# Patient Record
Sex: FEMALE | Race: WHITE | HISPANIC OR LATINO | Employment: UNEMPLOYED | URBAN - METROPOLITAN AREA
[De-identification: names, ages, dates, MRNs, and addresses within clinical notes are randomized per-mention and may not be internally consistent; named-entity substitution may affect disease eponyms.]

---

## 2017-01-05 ENCOUNTER — HOSPITAL ENCOUNTER (OUTPATIENT)
Facility: HOSPITAL | Age: 37
Discharge: HOME | End: 2017-01-07
Attending: EMERGENCY MEDICINE | Admitting: HOSPITALIST
Payer: MEDICAID

## 2017-01-05 DIAGNOSIS — D64.9 NORMOCYTIC NORMOCHROMIC ANEMIA: ICD-10-CM

## 2017-01-05 DIAGNOSIS — K80.20 CALCULUS OF GALLBLADDER WITHOUT CHOLECYSTITIS WITHOUT OBSTRUCTION: ICD-10-CM

## 2017-01-05 DIAGNOSIS — E03.9 HYPOTHYROIDISM, UNSPECIFIED TYPE: ICD-10-CM

## 2017-01-05 DIAGNOSIS — K80.20 CHOLELITHIASIS: ICD-10-CM

## 2017-01-05 DIAGNOSIS — K80.00 ACUTE CHOLECYSTITIS DUE TO BILIARY CALCULUS: Primary | ICD-10-CM

## 2017-01-05 LAB
ALBUMIN SERPL BCP-MCNC: 3.9 G/DL
ALP SERPL-CCNC: 343 U/L
ALT SERPL W/O P-5'-P-CCNC: 606 U/L
ANION GAP SERPL CALC-SCNC: 4 MMOL/L
AST SERPL-CCNC: 464 U/L
B-HCG UR QL: NEGATIVE
BACTERIA #/AREA URNS HPF: ABNORMAL /HPF
BASOPHILS # BLD AUTO: 0.02 K/UL
BASOPHILS NFR BLD: 0.4 %
BILIRUB SERPL-MCNC: 0.9 MG/DL
BILIRUB UR QL STRIP: ABNORMAL
BUN SERPL-MCNC: 7 MG/DL
CALCIUM SERPL-MCNC: 9.9 MG/DL
CHLORIDE SERPL-SCNC: 103 MMOL/L
CLARITY UR: CLEAR
CO2 SERPL-SCNC: 32 MMOL/L
COLOR UR: ABNORMAL
CREAT SERPL-MCNC: 0.8 MG/DL
CTP QC/QA: YES
DIFFERENTIAL METHOD: ABNORMAL
EOSINOPHIL # BLD AUTO: 0.1 K/UL
EOSINOPHIL NFR BLD: 2.8 %
ERYTHROCYTE [DISTWIDTH] IN BLOOD BY AUTOMATED COUNT: 13.9 %
EST. GFR  (AFRICAN AMERICAN): >60 ML/MIN/1.73 M^2
EST. GFR  (NON AFRICAN AMERICAN): >60 ML/MIN/1.73 M^2
GLUCOSE SERPL-MCNC: 100 MG/DL
GLUCOSE UR QL STRIP: NEGATIVE
HCT VFR BLD AUTO: 36.4 %
HGB BLD-MCNC: 11.5 G/DL
HGB UR QL STRIP: NEGATIVE
HYALINE CASTS #/AREA URNS LPF: 1 /LPF
KETONES UR QL STRIP: ABNORMAL
LEUKOCYTE ESTERASE UR QL STRIP: NEGATIVE
LIPASE SERPL-CCNC: 15 U/L
LYMPHOCYTES # BLD AUTO: 2 K/UL
LYMPHOCYTES NFR BLD: 42.1 %
MCH RBC QN AUTO: 28.1 PG
MCHC RBC AUTO-ENTMCNC: 31.6 %
MCV RBC AUTO: 89 FL
MICROSCOPIC COMMENT: ABNORMAL
MONOCYTES # BLD AUTO: 0.4 K/UL
MONOCYTES NFR BLD: 7.5 %
NEUTROPHILS # BLD AUTO: 2.2 K/UL
NEUTROPHILS NFR BLD: 47 %
NITRITE UR QL STRIP: NEGATIVE
PH UR STRIP: 5 [PH] (ref 5–8)
PLATELET # BLD AUTO: 269 K/UL
PMV BLD AUTO: 9.2 FL
POTASSIUM SERPL-SCNC: 4 MMOL/L
PROT SERPL-MCNC: 8.2 G/DL
PROT UR QL STRIP: ABNORMAL
RBC # BLD AUTO: 4.09 M/UL
RBC #/AREA URNS HPF: 2 /HPF (ref 0–4)
SODIUM SERPL-SCNC: 139 MMOL/L
SP GR UR STRIP: 1.03 (ref 1–1.03)
URN SPEC COLLECT METH UR: ABNORMAL
UROBILINOGEN UR STRIP-ACNC: ABNORMAL EU/DL
WBC # BLD AUTO: 4.66 K/UL
WBC #/AREA URNS HPF: 3 /HPF (ref 0–5)

## 2017-01-05 PROCEDURE — 25000003 PHARM REV CODE 250: Performed by: PHYSICIAN ASSISTANT

## 2017-01-05 PROCEDURE — 83690 ASSAY OF LIPASE: CPT

## 2017-01-05 PROCEDURE — 99285 EMERGENCY DEPT VISIT HI MDM: CPT | Mod: 25

## 2017-01-05 PROCEDURE — 96376 TX/PRO/DX INJ SAME DRUG ADON: CPT

## 2017-01-05 PROCEDURE — 85025 COMPLETE CBC W/AUTO DIFF WBC: CPT

## 2017-01-05 PROCEDURE — 96374 THER/PROPH/DIAG INJ IV PUSH: CPT

## 2017-01-05 PROCEDURE — 81025 URINE PREGNANCY TEST: CPT | Performed by: EMERGENCY MEDICINE

## 2017-01-05 PROCEDURE — 80053 COMPREHEN METABOLIC PANEL: CPT

## 2017-01-05 PROCEDURE — 96361 HYDRATE IV INFUSION ADD-ON: CPT

## 2017-01-05 PROCEDURE — G0378 HOSPITAL OBSERVATION PER HR: HCPCS

## 2017-01-05 PROCEDURE — 81000 URINALYSIS NONAUTO W/SCOPE: CPT

## 2017-01-05 PROCEDURE — 63600175 PHARM REV CODE 636 W HCPCS: Performed by: PHYSICIAN ASSISTANT

## 2017-01-05 RX ORDER — KETOROLAC TROMETHAMINE 30 MG/ML
10 INJECTION, SOLUTION INTRAMUSCULAR; INTRAVENOUS EVERY 6 HOURS PRN
Status: DISCONTINUED | OUTPATIENT
Start: 2017-01-05 | End: 2017-01-06

## 2017-01-05 RX ORDER — DEXTROSE MONOHYDRATE AND SODIUM CHLORIDE 5; .9 G/100ML; G/100ML
INJECTION, SOLUTION INTRAVENOUS CONTINUOUS
Status: DISCONTINUED | OUTPATIENT
Start: 2017-01-05 | End: 2017-01-07 | Stop reason: HOSPADM

## 2017-01-05 RX ORDER — MORPHINE SULFATE 10 MG/ML
4 INJECTION INTRAMUSCULAR; INTRAVENOUS; SUBCUTANEOUS
Status: COMPLETED | OUTPATIENT
Start: 2017-01-05 | End: 2017-01-05

## 2017-01-05 RX ORDER — KETOROLAC TROMETHAMINE 30 MG/ML
15 INJECTION, SOLUTION INTRAMUSCULAR; INTRAVENOUS
Status: COMPLETED | OUTPATIENT
Start: 2017-01-05 | End: 2017-01-05

## 2017-01-05 RX ORDER — FAMOTIDINE 10 MG/ML
20 INJECTION INTRAVENOUS EVERY 12 HOURS
Status: DISCONTINUED | OUTPATIENT
Start: 2017-01-05 | End: 2017-01-07 | Stop reason: HOSPADM

## 2017-01-05 RX ORDER — ONDANSETRON 2 MG/ML
4 INJECTION INTRAMUSCULAR; INTRAVENOUS
Status: COMPLETED | OUTPATIENT
Start: 2017-01-05 | End: 2017-01-05

## 2017-01-05 RX ORDER — ONDANSETRON 2 MG/ML
4 INJECTION INTRAMUSCULAR; INTRAVENOUS EVERY 12 HOURS PRN
Status: DISCONTINUED | OUTPATIENT
Start: 2017-01-05 | End: 2017-01-07 | Stop reason: HOSPADM

## 2017-01-05 RX ADMIN — FAMOTIDINE 20 MG: 10 INJECTION, SOLUTION INTRAVENOUS at 11:01

## 2017-01-05 RX ADMIN — DEXTROSE MONOHYDRATE AND SODIUM CHLORIDE: 5; .9 INJECTION, SOLUTION INTRAVENOUS at 10:01

## 2017-01-05 RX ADMIN — ONDANSETRON 4 MG: 2 INJECTION INTRAMUSCULAR; INTRAVENOUS at 07:01

## 2017-01-05 RX ADMIN — KETOROLAC TROMETHAMINE 15 MG: 30 INJECTION, SOLUTION INTRAMUSCULAR at 07:01

## 2017-01-05 RX ADMIN — SODIUM CHLORIDE 1000 ML: 0.9 INJECTION, SOLUTION INTRAVENOUS at 07:01

## 2017-01-05 RX ADMIN — MORPHINE SULFATE 4 MG: 10 INJECTION INTRAVENOUS at 07:01

## 2017-01-05 NOTE — IP AVS SNAPSHOT
[place] [address] [place] [address]  Phone: [phone]           Patient Discharge Instructions     Our goal is to set you up for success. This packet includes information on your condition, medications, and your home care. It will help you to care for yourself so you don't get sicker and need to go back to the hospital.     Please ask your nurse if you have any questions.        There are many details to remember when preparing to leave the hospital. Here is what you will need to do:    1. Take your medicine. If you are prescribed medications, review your Medication List in the following pages. You may have new medications to  at the pharmacy and others that you'll need to stop taking. Review the instructions for how and when to take your medications. Talk with your doctor or nurses if you are unsure of what to do.     2. Go to your follow-up appointments. Specific follow-up information is listed in the following pages. Your may be contacted by a transition nurse or clinical provider about future appointments. Be sure we have all of the phone numbers to reach you, if needed. Please contact your provider's office if you are unable to make an appointment.     3. Watch for warning signs. Your doctor or nurse will give you detailed warning signs to watch for and when to call for assistance. These instructions may also include educational information about your condition. If you experience any of warning signs to your health, call your doctor.           ** Verificar la lista de medicamentos es correcta y está actualizada. Llevar esto con usted en tessie de emergencia. Si ronnie medicamentos joseph cambiado, por favor notifique a alberto proveedor de atención médica.             Medication List      START taking these medications        Additional Info                      hydrocodone-acetaminophen 5-325mg 5-325 mg per tablet   Commonly known as:  NORCO   Quantity:  30 tablet   Refills:   0   Dose:  1 tablet    Last time this was given:  1 tablet on 1/7/2017  2:57 PM   Instructions:  Take 1 tablet by mouth every 6 (six) hours as needed.     Begin Date    AM    Noon    PM    Bedtime         CONTINUE taking these medications        Additional Info                      levothyroxine 50 MCG tablet   Commonly known as:  SYNTHROID   Quantity:  30 tablet   Refills:  6   Dose:  50 mcg    Instructions:  Take 1 tablet (50 mcg total) by mouth once daily.     Begin Date    AM    Noon    PM    Bedtime         STOP taking these medications     omeprazole 40 MG capsule   Commonly known as:  PRILOSEC            Where to Get Your Medications      These medications were sent to [id number] - [place], [place] - [address] AT [facility] [address]  [address] [place] [address]    Hours:  24-hours Phone:  [phone]     hydrocodone-acetaminophen 5-325mg 5-325 mg per tablet                  Por favor traiga a todos las citas de seguimiento:    1. Jackelyn copia de las instrucciones de luis.  2. Todos los medicamentos que está tomando ene momento, en ronnie envases originales.  3. Identificación y tarjeta de seguro.    Por favor llegue 15 minutos antes de la hora de la suhas.    Por favor llame con 24 horas de antelación si tiene que cambiar alberto suhas y / o tiempo.        Your Scheduled Appointments     Jan 26, 2017  9:00 AM CST   Established Patient with MD Dr. Sharon Ojeda ([facility])    [address] [facility] [address] [phone]              Follow-up Information     Follow up with Sharon Saab MD. Schedule an appointment as soon as possible for a visit in 1 week.    Specialty:  Family Medicine    Contact information:    [address] [place] [address] [phone]          Follow up with Louie Pihlip MD On 1/19/2017.    Specialty:  General Surgery    Why:  1:10 p.m. on January 19, 2017 see Dr. Philip for your hospital followup visit.    Contact  "information:    [address] [place] [address]  [phone]            Primary Diagnosis     Your primary diagnosis was:  Acute Cholecystitis Due To Biliary Calculus      Admission Information     Date & Time Provider Department CSN    1/5/2017  6:07 PM Sharon Saab MD [facility] [id number]      Care Providers     Provider Role Specialty Primary office phone    Sharon Saab MD Attending Provider Family Medicine [phone]    [facility], [facility] Consulting Physician  -- Number not on file    Javi Avalos MD Consulting Physician  Gastroenterology [phone]    Louie Philip MD Consulting Physician  General Surgery  [phone]    Louie Philip MD Surgeon  General Surgery [phone]    Sam Ornelas MD Consulting Physician  Internal Medicine  [phone]      Your Vitals Were     BP Pulse Temp Resp Height Weight    118/63 (BP Location: Left arm, Patient Position: Lying, BP Method: Automatic) 96 98.6 °F (37 °C) (Oral) 18 5' 2" (1.575 m) 59 kg (130 lb)    Last Period SpO2 BMI          11/15/2016 99% 23.78 kg/m2        Recent Lab Values     No lab values to display.      Pending Labs     Order Current Status    Specimen to Pathology - Surgery Collected (01/06/17 1512)    Specimen to Pathology - Surgery Collected (01/06/17 1515)    Hepatitis panel, acute In process      Allergies as of 1/7/2017        Reactions    Epinephrine       [facility]     [facility] Nurse Care Line - 24/7 Assistance  Unless otherwise directed by your provider, please contact [facility], our nurse care line that is available for 24/7 assistance.     Registered nurses in the [facility] provide clinical advisement, health education, appointment booking, and other advisory services.  Call for this free service at [phone].        Advance Directives     An advance directive is a document which, in the event you are no longer able to make decisions for yourself, tells your " healthcare team what kind of treatment you do or do not want to receive, or who you would like to make those decisions for you.  If you do not currently have an advance directive, [facility] encourages you to create one.  For more information call:  [phone] ([phone]),  or log on to [email or web address].        Language Assistance Services     ATTENTION: Language assistance services are available, free of charge. Please call [phone].      ATENCIÓN: Si habla español, tiene a alberto disposición servicios gratuitos de asistencia lingüística. Llame al [phone].     CHÚ Ý: N?u b?n nói Ti?ng Vi?t, có các d?ch v? h? tr? ngôn ng? mi?n phí dành cho b?n. G?i s? [phone].        [facility] Sign-Up     Activating your [facility] account is as easy as 1-2-3!     1) Visit [email or web address], select Sign Up Now, enter this activation code and your date of birth, then select Next.  [email or web address]  Expires: 2/19/2017  9:58 PM      2) Create a username and password to use when you visit [facility] in the future and select a security question in case you lose your password and select Next.    3) Enter your e-mail address and click Sign Up!    Additional Information  If you have questions, please e-mail [email or web address] or call [phone] to talk to our [facility] staff. Remember, [facility] is NOT to be used for urgent needs. For medical emergencies, dial 911.          [facility] complies with applicable Federal civil rights laws and does not discriminate on the basis of race, color, national origin, age, disability, or sex.

## 2017-01-05 NOTE — IP AVS SNAPSHOT
[address] [place] [address]  Phone: [phone]           I have received a copy of my After Visit Summary and discharge instructions from [facility].    INSTRUCTIONS RECEIVED AND UNDERSTOOD BY:                     Patient/Patient Representative: ________________________________________________________________     Date/Time: ________________________________________________________________                     Instructions Given By: ________________________________________________________________     Date/Time: ________________________________________________________________

## 2017-01-06 ENCOUNTER — ANESTHESIA EVENT (OUTPATIENT)
Dept: SURGERY | Facility: HOSPITAL | Age: 37
End: 2017-01-06
Payer: MEDICAID

## 2017-01-06 ENCOUNTER — ANESTHESIA (OUTPATIENT)
Dept: SURGERY | Facility: HOSPITAL | Age: 37
End: 2017-01-06
Payer: MEDICAID

## 2017-01-06 PROBLEM — K80.50 BILIARY COLIC: Status: ACTIVE | Noted: 2017-01-06

## 2017-01-06 LAB
ALBUMIN SERPL BCP-MCNC: 3.1 G/DL
ALP SERPL-CCNC: 293 U/L
ALT SERPL W/O P-5'-P-CCNC: 638 U/L
ANION GAP SERPL CALC-SCNC: 4 MMOL/L
AST SERPL-CCNC: 553 U/L
BASOPHILS # BLD AUTO: 0.01 K/UL
BASOPHILS NFR BLD: 0.3 %
BILIRUB SERPL-MCNC: 0.9 MG/DL
BUN SERPL-MCNC: 6 MG/DL
CALCIUM SERPL-MCNC: 8.6 MG/DL
CHLORIDE SERPL-SCNC: 110 MMOL/L
CO2 SERPL-SCNC: 27 MMOL/L
CREAT SERPL-MCNC: 0.7 MG/DL
DIFFERENTIAL METHOD: ABNORMAL
EOSINOPHIL # BLD AUTO: 0.2 K/UL
EOSINOPHIL NFR BLD: 4.4 %
ERYTHROCYTE [DISTWIDTH] IN BLOOD BY AUTOMATED COUNT: 14.4 %
EST. GFR  (AFRICAN AMERICAN): >60 ML/MIN/1.73 M^2
EST. GFR  (NON AFRICAN AMERICAN): >60 ML/MIN/1.73 M^2
FERRITIN SERPL-MCNC: 158 NG/ML
FOLATE SERPL-MCNC: 12.2 NG/ML
GLUCOSE SERPL-MCNC: 107 MG/DL
HCT VFR BLD AUTO: 31.6 %
HGB BLD-MCNC: 10.1 G/DL
IRON SERPL-MCNC: 163 UG/DL
LYMPHOCYTES # BLD AUTO: 2.2 K/UL
LYMPHOCYTES NFR BLD: 65 %
MCH RBC QN AUTO: 29 PG
MCHC RBC AUTO-ENTMCNC: 32 %
MCV RBC AUTO: 91 FL
MONOCYTES # BLD AUTO: 0.3 K/UL
MONOCYTES NFR BLD: 7.6 %
NEUTROPHILS # BLD AUTO: 0.8 K/UL
NEUTROPHILS NFR BLD: 22.7 %
PLATELET # BLD AUTO: 210 K/UL
PMV BLD AUTO: 9.2 FL
POTASSIUM SERPL-SCNC: 4.2 MMOL/L
PROT SERPL-MCNC: 6.6 G/DL
RBC # BLD AUTO: 3.48 M/UL
SATURATED IRON: 55 %
SODIUM SERPL-SCNC: 141 MMOL/L
T3 SERPL-MCNC: 89 NG/DL
T4 FREE SERPL-MCNC: 0.9 NG/DL
T4 SERPL-MCNC: 5.7 UG/DL
TOTAL IRON BINDING CAPACITY: 295 UG/DL
TRANSFERRIN SERPL-MCNC: 199 MG/DL
TSH SERPL DL<=0.005 MIU/L-ACNC: 10.6 UIU/ML
VIT B12 SERPL-MCNC: 974 PG/ML
WBC # BLD AUTO: 3.43 K/UL

## 2017-01-06 PROCEDURE — 25000003 PHARM REV CODE 250: Performed by: SURGERY

## 2017-01-06 PROCEDURE — 88304 TISSUE EXAM BY PATHOLOGIST: CPT | Mod: 26,,,

## 2017-01-06 PROCEDURE — G0378 HOSPITAL OBSERVATION PER HR: HCPCS

## 2017-01-06 PROCEDURE — 82728 ASSAY OF FERRITIN: CPT

## 2017-01-06 PROCEDURE — 85025 COMPLETE CBC W/AUTO DIFF WBC: CPT

## 2017-01-06 PROCEDURE — 82746 ASSAY OF FOLIC ACID SERUM: CPT

## 2017-01-06 PROCEDURE — 82607 VITAMIN B-12: CPT

## 2017-01-06 PROCEDURE — 88304 TISSUE EXAM BY PATHOLOGIST: CPT

## 2017-01-06 PROCEDURE — 36000708 HC OR TIME LEV III 1ST 15 MIN: Performed by: SURGERY

## 2017-01-06 PROCEDURE — 25000003 PHARM REV CODE 250: Performed by: PHYSICIAN ASSISTANT

## 2017-01-06 PROCEDURE — 71000039 HC RECOVERY, EACH ADD'L HOUR: Performed by: SURGERY

## 2017-01-06 PROCEDURE — 63600175 PHARM REV CODE 636 W HCPCS: Performed by: NURSE ANESTHETIST, CERTIFIED REGISTERED

## 2017-01-06 PROCEDURE — 63600175 PHARM REV CODE 636 W HCPCS: Performed by: SURGERY

## 2017-01-06 PROCEDURE — D9220A PRA ANESTHESIA: Mod: CRNA,,, | Performed by: NURSE ANESTHETIST, CERTIFIED REGISTERED

## 2017-01-06 PROCEDURE — 84480 ASSAY TRIIODOTHYRONINE (T3): CPT

## 2017-01-06 PROCEDURE — 71000033 HC RECOVERY, INTIAL HOUR: Performed by: SURGERY

## 2017-01-06 PROCEDURE — 36000709 HC OR TIME LEV III EA ADD 15 MIN: Performed by: SURGERY

## 2017-01-06 PROCEDURE — 80074 ACUTE HEPATITIS PANEL: CPT

## 2017-01-06 PROCEDURE — 37000008 HC ANESTHESIA 1ST 15 MINUTES: Performed by: SURGERY

## 2017-01-06 PROCEDURE — 25000003 PHARM REV CODE 250: Performed by: NURSE ANESTHETIST, CERTIFIED REGISTERED

## 2017-01-06 PROCEDURE — 80053 COMPREHEN METABOLIC PANEL: CPT

## 2017-01-06 PROCEDURE — 84436 ASSAY OF TOTAL THYROXINE: CPT

## 2017-01-06 PROCEDURE — 84443 ASSAY THYROID STIM HORMONE: CPT

## 2017-01-06 PROCEDURE — 37000009 HC ANESTHESIA EA ADD 15 MINS: Performed by: SURGERY

## 2017-01-06 PROCEDURE — 83540 ASSAY OF IRON: CPT

## 2017-01-06 PROCEDURE — 27201423 OPTIME MED/SURG SUP & DEVICES STERILE SUPPLY: Performed by: SURGERY

## 2017-01-06 PROCEDURE — 84439 ASSAY OF FREE THYROXINE: CPT

## 2017-01-06 PROCEDURE — D9220A PRA ANESTHESIA: Mod: ANES,,, | Performed by: ANESTHESIOLOGY

## 2017-01-06 PROCEDURE — 36415 COLL VENOUS BLD VENIPUNCTURE: CPT

## 2017-01-06 RX ORDER — CEFAZOLIN SODIUM 1 G/50ML
1 SOLUTION INTRAVENOUS
Status: DISCONTINUED | OUTPATIENT
Start: 2017-01-06 | End: 2017-01-06

## 2017-01-06 RX ORDER — PHENYLEPHRINE HYDROCHLORIDE 10 MG/ML
INJECTION INTRAVENOUS
Status: DISCONTINUED | OUTPATIENT
Start: 2017-01-06 | End: 2017-01-06

## 2017-01-06 RX ORDER — PROPOFOL 10 MG/ML
VIAL (ML) INTRAVENOUS
Status: DISCONTINUED | OUTPATIENT
Start: 2017-01-06 | End: 2017-01-06

## 2017-01-06 RX ORDER — ONDANSETRON 2 MG/ML
4 INJECTION INTRAMUSCULAR; INTRAVENOUS EVERY 8 HOURS PRN
Status: DISCONTINUED | OUTPATIENT
Start: 2017-01-06 | End: 2017-01-06 | Stop reason: HOSPADM

## 2017-01-06 RX ORDER — SODIUM CHLORIDE, SODIUM LACTATE, POTASSIUM CHLORIDE, CALCIUM CHLORIDE 600; 310; 30; 20 MG/100ML; MG/100ML; MG/100ML; MG/100ML
INJECTION, SOLUTION INTRAVENOUS CONTINUOUS PRN
Status: DISCONTINUED | OUTPATIENT
Start: 2017-01-06 | End: 2017-01-06

## 2017-01-06 RX ORDER — MORPHINE SULFATE 10 MG/ML
2 INJECTION INTRAMUSCULAR; INTRAVENOUS; SUBCUTANEOUS EVERY 6 HOURS PRN
Status: DISCONTINUED | OUTPATIENT
Start: 2017-01-06 | End: 2017-01-06

## 2017-01-06 RX ORDER — GLYCOPYRROLATE 0.2 MG/ML
INJECTION INTRAMUSCULAR; INTRAVENOUS
Status: DISCONTINUED | OUTPATIENT
Start: 2017-01-06 | End: 2017-01-06

## 2017-01-06 RX ORDER — SUCCINYLCHOLINE CHLORIDE 20 MG/ML
INJECTION INTRAMUSCULAR; INTRAVENOUS
Status: DISCONTINUED | OUTPATIENT
Start: 2017-01-06 | End: 2017-01-06

## 2017-01-06 RX ORDER — MORPHINE SULFATE 10 MG/ML
4 INJECTION INTRAMUSCULAR; INTRAVENOUS; SUBCUTANEOUS EVERY 4 HOURS PRN
Status: DISCONTINUED | OUTPATIENT
Start: 2017-01-06 | End: 2017-01-07 | Stop reason: HOSPADM

## 2017-01-06 RX ORDER — CEFAZOLIN SODIUM 1 G/50ML
SOLUTION INTRAVENOUS
Status: DISPENSED
Start: 2017-01-06 | End: 2017-01-07

## 2017-01-06 RX ORDER — METOCLOPRAMIDE HYDROCHLORIDE 5 MG/ML
INJECTION INTRAMUSCULAR; INTRAVENOUS
Status: DISCONTINUED | OUTPATIENT
Start: 2017-01-06 | End: 2017-01-06

## 2017-01-06 RX ORDER — FENTANYL CITRATE 50 UG/ML
INJECTION, SOLUTION INTRAMUSCULAR; INTRAVENOUS
Status: DISCONTINUED | OUTPATIENT
Start: 2017-01-06 | End: 2017-01-06

## 2017-01-06 RX ORDER — BUPIVACAINE HYDROCHLORIDE AND EPINEPHRINE 2.5; 5 MG/ML; UG/ML
INJECTION, SOLUTION EPIDURAL; INFILTRATION; INTRACAUDAL; PERINEURAL
Status: DISCONTINUED | OUTPATIENT
Start: 2017-01-06 | End: 2017-01-06 | Stop reason: HOSPADM

## 2017-01-06 RX ORDER — HYDROCODONE BITARTRATE AND ACETAMINOPHEN 5; 325 MG/1; MG/1
1 TABLET ORAL EVERY 6 HOURS PRN
Status: DISCONTINUED | OUTPATIENT
Start: 2017-01-06 | End: 2017-01-07 | Stop reason: HOSPADM

## 2017-01-06 RX ORDER — METOPROLOL TARTRATE 1 MG/ML
INJECTION, SOLUTION INTRAVENOUS
Status: DISCONTINUED | OUTPATIENT
Start: 2017-01-06 | End: 2017-01-06

## 2017-01-06 RX ORDER — DIPHENHYDRAMINE HYDROCHLORIDE 50 MG/ML
25 INJECTION INTRAMUSCULAR; INTRAVENOUS EVERY 6 HOURS PRN
Status: DISCONTINUED | OUTPATIENT
Start: 2017-01-06 | End: 2017-01-06 | Stop reason: HOSPADM

## 2017-01-06 RX ORDER — ROCURONIUM BROMIDE 10 MG/ML
INJECTION, SOLUTION INTRAVENOUS
Status: DISCONTINUED | OUTPATIENT
Start: 2017-01-06 | End: 2017-01-06

## 2017-01-06 RX ORDER — HYDROMORPHONE HYDROCHLORIDE 2 MG/ML
0.2 INJECTION, SOLUTION INTRAMUSCULAR; INTRAVENOUS; SUBCUTANEOUS EVERY 5 MIN PRN
Status: DISCONTINUED | OUTPATIENT
Start: 2017-01-06 | End: 2017-01-06 | Stop reason: HOSPADM

## 2017-01-06 RX ORDER — MIDAZOLAM HYDROCHLORIDE 1 MG/ML
INJECTION, SOLUTION INTRAMUSCULAR; INTRAVENOUS
Status: DISCONTINUED | OUTPATIENT
Start: 2017-01-06 | End: 2017-01-06

## 2017-01-06 RX ORDER — ONDANSETRON 2 MG/ML
INJECTION INTRAMUSCULAR; INTRAVENOUS
Status: DISCONTINUED | OUTPATIENT
Start: 2017-01-06 | End: 2017-01-06

## 2017-01-06 RX ORDER — NEOSTIGMINE METHYLSULFATE 1 MG/ML
INJECTION, SOLUTION INTRAVENOUS
Status: DISCONTINUED | OUTPATIENT
Start: 2017-01-06 | End: 2017-01-06

## 2017-01-06 RX ORDER — SODIUM CHLORIDE 0.9 % (FLUSH) 0.9 %
3 SYRINGE (ML) INJECTION
Status: DISCONTINUED | OUTPATIENT
Start: 2017-01-06 | End: 2017-01-06 | Stop reason: HOSPADM

## 2017-01-06 RX ADMIN — ROCURONIUM BROMIDE 5 MG: 10 INJECTION, SOLUTION INTRAVENOUS at 02:01

## 2017-01-06 RX ADMIN — CEFAZOLIN SODIUM 1 G: 1 SOLUTION INTRAVENOUS at 01:01

## 2017-01-06 RX ADMIN — FENTANYL CITRATE 100 MCG: 50 INJECTION INTRAMUSCULAR; INTRAVENOUS at 02:01

## 2017-01-06 RX ADMIN — SUCCINYLCHOLINE CHLORIDE 100 MG: 20 INJECTION, SOLUTION INTRAMUSCULAR; INTRAVENOUS at 02:01

## 2017-01-06 RX ADMIN — SODIUM CHLORIDE, SODIUM LACTATE, POTASSIUM CHLORIDE, AND CALCIUM CHLORIDE: .6; .31; .03; .02 INJECTION, SOLUTION INTRAVENOUS at 03:01

## 2017-01-06 RX ADMIN — PHENYLEPHRINE HYDROCHLORIDE 100 MCG: 10 INJECTION INTRAVENOUS at 02:01

## 2017-01-06 RX ADMIN — ROCURONIUM BROMIDE 10 MG: 10 INJECTION, SOLUTION INTRAVENOUS at 02:01

## 2017-01-06 RX ADMIN — GLYCOPYRROLATE 0.6 MG: 0.2 INJECTION, SOLUTION INTRAMUSCULAR; INTRAVENOUS at 02:01

## 2017-01-06 RX ADMIN — SODIUM CHLORIDE, SODIUM LACTATE, POTASSIUM CHLORIDE, AND CALCIUM CHLORIDE: .6; .31; .03; .02 INJECTION, SOLUTION INTRAVENOUS at 01:01

## 2017-01-06 RX ADMIN — NEOSTIGMINE METHYLSULFATE 4 MG: 1 INJECTION INTRAVENOUS at 02:01

## 2017-01-06 RX ADMIN — PROPOFOL 150 MG: 10 INJECTION, EMULSION INTRAVENOUS at 02:01

## 2017-01-06 RX ADMIN — PROPOFOL 100 MG: 10 INJECTION, EMULSION INTRAVENOUS at 02:01

## 2017-01-06 RX ADMIN — FAMOTIDINE 20 MG: 10 INJECTION, SOLUTION INTRAVENOUS at 08:01

## 2017-01-06 RX ADMIN — METOCLOPRAMIDE 10 MG: 5 INJECTION, SOLUTION INTRAMUSCULAR; INTRAVENOUS at 01:01

## 2017-01-06 RX ADMIN — PROPOFOL 50 MG: 10 INJECTION, EMULSION INTRAVENOUS at 02:01

## 2017-01-06 RX ADMIN — MORPHINE SULFATE 4 MG: 10 INJECTION INTRAVENOUS at 06:01

## 2017-01-06 RX ADMIN — ONDANSETRON 4 MG: 2 INJECTION, SOLUTION INTRAMUSCULAR; INTRAVENOUS at 01:01

## 2017-01-06 RX ADMIN — MIDAZOLAM HYDROCHLORIDE 2 MG: 1 INJECTION, SOLUTION INTRAMUSCULAR; INTRAVENOUS at 01:01

## 2017-01-06 RX ADMIN — GLYCOPYRROLATE 0.2 MG: 0.2 INJECTION, SOLUTION INTRAMUSCULAR; INTRAVENOUS at 01:01

## 2017-01-06 RX ADMIN — METOPROLOL TARTRATE 2 MG: 1 INJECTION, SOLUTION INTRAVENOUS at 02:01

## 2017-01-06 NOTE — CONSULTS
[facility]  Surgery H&P    Subjective:     Chief Complaint/Reason for Admission: abdominal pain    History of Present Illness:   Patient is a 36 y.o. female presents with abdominal pain. Onset of symptoms was gradual starting 7 days ago with gradually worsening course since that time. The pain is located in the RUQ with radiation to back. Patient describes the pain as aching and sharp, continuous and rated as severe. Pain has been associated with nausea. Patient denies change in bowel habits and vomiting. Symptoms are aggravated by eating. Symptoms improve with pain meds.     Patient Active Problem List   Diagnosis    GERD (gastroesophageal reflux disease)    Asthma    Healthcare maintenance    History of hepatitis A    BMI 25.0-25.9,adult    Calculus of gallbladder without cholecystitis    Hypothyroidism    Cholelithiasis          No current facility-administered medications on file prior to encounter.      Current Outpatient Prescriptions on File Prior to Encounter   Medication Sig Dispense Refill    levothyroxine (SYNTHROID) 50 MCG tablet Take 1 tablet (50 mcg total) by mouth once daily. 30 tablet 6          Review of patient's allergies indicates:   Allergen Reactions    Epinephrine           Past Medical History   Diagnosis Date    Asthma     Hepatitis           Past Surgical History   Procedure Laterality Date    Tonsillectomy      Tubal ligation  2014     section       x 3          Family History   Problem Relation Age of Onset    Hypertension Mother     Asthma Father     Hypertension Maternal Grandmother           Social History     Social History    Marital status: Single     Spouse name: N/A    Number of children: 3    Years of education: N/A     Occupational History    Unemployed      Social History Main Topics    Smoking status: Never Smoker    Smokeless tobacco: Never Used    Alcohol use No    Drug use: No    Sexual activity: Yes     Partners: Male     Other  Topics Concern    Not on file     Social History Narrative       Review of Systems:  Constitutional: positive for anorexia, negative for chills and fevers  Respiratory: negative for cough and dyspnea on exertion  Cardiovascular: negative for chest pain and palpitations  Hematologic/lymphatic: negative for bleeding and easy bruising     Physical Exam:    Vitals:    01/06/17 0730   BP: 133/67   Pulse: (!) 50   Resp: 18   Temp: 97.5 °F (36.4 °C)       General: Alert and oriented, No acute distress.   Eye: Extraocular movements are intact, Normal conjunctiva.  No scleral icterus  HENT: Normocephalic, Normal hearing, Oral mucosa is moist, No pharyngeal erythema.   Neck: Supple, Non-tender, No jugular venous distention.  No mass or LAD  Respiratory: Respirations are non-labored, Symmetrical chest wall expansion, No chest wall tenderness.   Cardiovascular: Normal rate, Regular rhythm, Extremities warm, No edema.   Gastrointestinal: Soft, non-distended, mild TTP RUQ, negative Vidal's sign   Genitourinary: No costovertebral angle tenderness, No inguinal tenderness or mass.   Lymphatics: No lymphadenopathy neck, axilla, groin.   Musculoskeletal: Normal range of motion, Normal strength, No tenderness.   Integumentary: Warm, Dry.   Neurologic: Alert, Oriented, Normal motor function, No focal defects.   Psychiatric: Cooperative, Appropriate mood & affect, Normal judgment.    Data Review:  CBC:   Recent Labs  Lab 01/06/17  0408   WBC 3.43*   RBC 3.48*   HGB 10.1*   HCT 31.6*      MCV 91   MCH 29.0   MCHC 32.0     CMP:   Recent Labs  Lab 01/06/17  0408      CALCIUM 8.6*   ALBUMIN 3.1*   PROT 6.6      K 4.2   CO2 27      BUN 6   CREATININE 0.7   ALKPHOS 293*   *   *   BILITOT 0.9     US w/ stones, normal CBD. No wall thickening of fluid        ASSESSMENT/PLAN:     Abdominal pain w/ elevated LFTs  - suspect either biliary colic or acute cholecystitis  - HIDA scan pending    Possible  cholecystectomy today.  Keep NPO.    11:30 Addendum  HIDA w/ patent cystic duct.  Still suspect significant biliary colic.  Have recommended moving forward w/ cholecystectomy.  - R/B/A discussed at length with the patient and  at bedside  - All questions have been answered  - Patient has verbalized her understanding and wishes to proceed

## 2017-01-06 NOTE — UM SECONDARY REVIEW
[facility]    PA - Medical Necessity Issue  36 y.o. female presents with abdominal pain. Onset of symptoms was gradual starting 7 days ago with gradually worsening course since that time. The pain is located in the RUQ with radiation to back. Patient describes the pain as aching and sharp, continuous and rated as severe. Pain has been associated with nausea. Patient denies change in bowel habits and vomiting. Symptoms are aggravated by eating. Symptoms improve with pain meds.    Surgery today: CHOLECYSTECTOMY-LAPAROSCOPIC (N/A)     Approved Observation   Approved

## 2017-01-06 NOTE — OP NOTE
DATE OF PROCEDURE:  01/06/2017.    PREOPERATIVE DIAGNOSIS:  Biliary colic.    POSTOPERATIVE DIAGNOSIS:  Biliary colic.    PROCEDURE PERFORMED:  Laparoscopic cholecystectomy.    SURGEON:  Louie Philip M.D.    ANESTHESIA:  General.    COMPLICATIONS:  None.    ESTIMATED BLOOD LOSS:  30 mL.    SPECIMENS:  Gallbladder and contents.    INDICATION FOR PROCEDURE:  The patient is a very pleasant 36-year-old female   with progressively worsening symptoms of biliary colic.  She has no evidence of   cholecystitis; however, progressive pain that was unable to be controlled;   therefore, she was admitted.  She had elevated liver function test with no   concern for choledocholithiasis.  Ultimately definitive surgical management was   deemed appropriate.  Informed consent was obtained.    DESCRIPTION OF PROCEDURE:  The patient was taken to the Operating Room, placed   in supine position on the operative table.  General endotracheal anesthesia was   obtained.  The patient's abdomen was then prepped and draped in the usual   sterile manner.  A curvilinear infraumbilical skin incision was made and   electrocautery was used deep into the subcutaneous tissue.  Fascia was   identified and incised in its midline.  Two #0 stay Vicryl sutures were placed.    Marlon cannula was inserted and pneumoperitoneum was established.  Laparoscope   was introduced.  The abdomen was inspected.  There was no evidence of iatrogenic   injury on initial Marlon trocar placement.  Liver was normal in size and   contour.  There was no other gross pathology appreciated on general surveillance   of the abdomen.  Three additional 5-mm ports were placed under direct   visualization.  Gallbladder was identified and noted to be moderately distended   with mild inflammatory changes.  It was grasped and elevated above the liver.    Visceral peritoneum in the area of the cystic duct was taken down first   laterally and then subsequently medially.  There was a short  cystic duct   present.  Cystic duct and cystic artery were well skeletonized with the critical   view obtained.  Cystic duct and cystic artery were both doubly clipped and then   divided.  Electrocautery was then used to dissect the gallbladder from the   liver.  EndoCatch bag was inserted and the gallbladder eventually removed   through the infraumbilical fascial defect.  Gallbladder fossa was inspected,   irrigated and electrocautery used to obtain hemostasis on a few minor oozing   points.  The previously placed clips were inspected and completely intact   without any evidence of bleeding or bile leakage.  The right upper quadrant was   then irrigated and suctioned free of all fluid.  Pneumoperitoneum was then   allowed to collapse and all trocars were removed.  The infraumbilical fascial   defect was then closed using interrupted 0 Vicryl sutures.  All skin incisions   were infiltrated with local anesthetic and subsequently closed with 4-0 Monocryl   in subcuticular fashion.  Wounds were dressed with Steri-Strips, sterile   dressings.  The patient was then extubated and transferred to Recovery having   tolerated the procedure without any apparent complications.  All counts reported   to be correct prior to the cessation of the case.      FIDEL/  dd: 01/06/2017 15:16:38 (CST)  td: 01/06/2017 16:17:05 (CST)  Doc ID   #[id number]  Job ID #[id number]    CC:

## 2017-01-06 NOTE — NURSING
Surgical bath given. Patient remains NPO. Patient transported to surgery via stretcher, safety maintained. No acute distress noted.

## 2017-01-06 NOTE — BRIEF OP NOTE
[facility]  Brief Operative Note    SUMMARY     Surgery Date: 1/6/2017     Surgeon(s) and Role:     * Louie Philip MD - Primary     * Lee Pillai MD - Assisting        Pre-op Diagnosis:  Cholelithiases [K80.20]  Cholecystitis [K81.9]    Post-op Diagnosis:  Post-Op Diagnosis Codes:     * Cholelithiases [K80.20]     * Cholecystitis [K81.9]    Procedure(s) (LRB):  CHOLECYSTECTOMY-LAPAROSCOPIC (N/A)    Anesthesia: General    Description of Procedure: see dictation    Description of the findings of the procedure: see dictation    Estimated Blood Loss: 30 ml         Specimens:   gallbladder

## 2017-01-06 NOTE — ANESTHESIA POSTPROCEDURE EVALUATION
"Anesthesia Post Evaluation    Patient: Ramona Auguste    Procedure(s) Performed: Procedure(s) (LRB):  CHOLECYSTECTOMY-LAPAROSCOPIC (N/A)    Final Anesthesia Type: general  Patient location during evaluation: PACU  Patient participation: Yes- Able to Participate  Level of consciousness: awake  Post-procedure vital signs: reviewed and stable  Pain management: adequate  Airway patency: patent  PONV status at discharge: No PONV  Anesthetic complications: no      Cardiovascular status: blood pressure returned to baseline  Respiratory status: unassisted, spontaneous ventilation and room air  Hydration status: euvolemic  Follow-up not needed.        Visit Vitals    /72    Pulse 78    Temp 36.3 °C (97.4 °F)    Resp 12    Ht 5' 2" (1.575 m)    Wt 59 kg (130 lb)    LMP 11/15/2016    SpO2 100%    Breastfeeding No    BMI 23.78 kg/m2       Pain/Richardson Score: Pain Assessment Performed: Yes (1/6/2017  3:50 PM)  Presence of Pain: denies (1/6/2017  3:50 PM)  Pain Rating Prior to Med Admin: 8 (1/5/2017  7:12 PM)  Richardson Score: 9 (1/6/2017  3:50 PM)      "

## 2017-01-06 NOTE — PLAN OF CARE
Problem: Patient Care Overview  Goal: Plan of Care Review  Outcome: Ongoing (interventions implemented as appropriate)  Patient AAOX4.  Vital signs wnl.  Skin cdi.  IV site cdi; infusing fluids. Medications administered as prescribed and tolerates well. Consults in place.   Ambulate as tolerated. Daily labs.   Family at bedside.  Hourly rounding conducted to ensure safety.  Bed low and locked.  Will continue to monitor.

## 2017-01-06 NOTE — CONSULTS
"Gastroenterology    CC: Abdominal pain    HPI 36 y.o. female with recent, persistent, intermittent, RUQ, radiating to back, abd pain with some nausea for last several weeks.  Episode yesterday persisted and was severe enough to prompt ER admit.  No melena or hematochezia.  No emesis.  No heavy NSAID use.  No recent travel or sick contacts.  No fever.  No change in bowel habits.  Recent episodes spontaneously resolved but current episode has persisted.       Past Medical History   Diagnosis Date    Asthma     Hepatitis          Past Surgical History   Procedure Laterality Date    Tonsillectomy      Tubal ligation  2014     section       x 3       Social History  Social History   Substance Use Topics    Smoking status: Never Smoker    Smokeless tobacco: Never Used    Alcohol use No   No illicits      Family History   Problem Relation Age of Onset    Hypertension Mother     Asthma Father     Hypertension Maternal Grandmother        Review of Systems  General ROS: negative for chills, fever or weight loss  Psychological ROS: negative for hallucination, depression or suicidal ideation  Ophthalmic ROS: negative for blurry vision, photophobia or eye pain  ENT ROS: negative for epistaxis, sore throat or rhinorrhea  Respiratory ROS: no cough, shortness of breath, or wheezing  Cardiovascular ROS: no chest pain or dyspnea on exertion  Gastrointestinal ROS: no change in bowel habits, or black/ bloody stools  Genito-Urinary ROS: no dysuria, trouble voiding, or hematuria  Musculoskeletal ROS: negative for gait disturbance or muscular weakness  Neurological ROS: no syncope or seizures; no ataxia  Dermatological ROS: negative for pruritis, rash and jaundice    Physical Examination  Visit Vitals    BP (!) 110/56 (BP Location: Right arm, Patient Position: Lying, BP Method: Automatic)    Pulse (!) 50    Temp 98.4 °F (36.9 °C) (Oral)    Resp 18    Ht 5' 2" (1.575 m)    Wt 59 kg (130 lb)    LMP 11/15/2016    " SpO2 98%    Breastfeeding No    BMI 23.78 kg/m2     General appearance: alert, cooperative, no distress  HENT: Normocephalic, atraumatic, neck symmetrical, no nasal discharge   Eyes: conjunctivae/corneas clear, PERRL, EOM's intact  Lungs: clear to auscultation bilaterally, no dullness to percussion bilaterally  Heart: regular rate and rhythm without rub; no displacement of the PMI   Abdomen: soft, ND, no rebound or guarding, TTP RUQ/epigastrium  Extremities: extremities symmetric; no clubbing, cyanosis, or edema  Integument: Skin color, texture, turgor normal; no rashes; hair distrubution normal  Neurologic: Alert and oriented X 3, MAEW  Psychiatric: no pressured speech; normal affect; no evidence of impaired cognition     Labs:  Bili normal  Lipase normal    Imaging:  US - reviewed    Assessment:     37 yo female with recent intermittent biliary type pain with worsening/persistence now.  Cholelithiasis on US with 5mm CBD.  Bili normal.  No sign of cholangitis.  At this point, no very strong or strong predictors for CBD stone.    Plan:   - Surgery to assess for cholecystectomy  - Will check acute viral hep panel, though recent symptom history most c/w biliary etiology.   - Will follow briefly

## 2017-01-06 NOTE — TRANSFER OF CARE
"Anesthesia Transfer of Care Note    Patient: Ramona Auguste    Procedure(s) Performed: Procedure(s):  CHOLECYSTECTOMY-LAPAROSCOPIC    Patient location: PACU    Anesthesia Type: general    Transport from OR: Transported from OR on room air with adequate spontaneous ventilation    Post pain: adequate analgesia    Post assessment: no apparent anesthetic complications    Post vital signs: stable    Level of consciousness: awake    Nausea/Vomiting: no nausea/vomiting    Complications: none          Last vitals:   Visit Vitals    BP (!) 114/56    Pulse 98    Temp 36.5 °C (97.7 °F)    Resp 18    Ht 5' 2" (1.575 m)    Wt 59 kg (130 lb)    LMP 11/15/2016    SpO2 (!) 94%    Breastfeeding No    BMI 23.78 kg/m2     "

## 2017-01-06 NOTE — H&P
Subjective:       Patient ID: Ramona Auguste is a 36 y.o. female    Chief complaint:   Flank Pain (R sided flank pain for seven days, denies urinary complaints, denies n/v/d/f, Hx hypothyroid, asthma, hepatitis)      History of present illness:   36 y.o.  female with history of hypothyroidism and asthma admitted to [facility] on 2017 with complaints of recurrent epigastric and RUQ pain, associated with nausea and vomiting ,starting 2 weeks ago with an acute exacerbation few hours prior to her arrival at [facility].  She denies fever, hematemesis or melena.    Past Medical History:      Asthma     Hypothyroidism     Hepatitis        Past Surgical History:      Tonsillectomy      Tubal ligation  2014     section x 3         Family History:      Hypertension Mother     Asthma Father     Hypertension Maternal Grandmother        Social History:      Marital status: Single     Spouse name: N/A    Number of children: 3     Occupational History    Unemployed      Social History Main Topics    Smoking status: Never Smoker    Smokeless tobacco: Never Used    Alcohol use No    Drug use: No    Sexual activity: Yes     Partners: Male       Allergies:      Review of patient's allergies indicates:   Allergen Reactions    Epinephrine        Medications:      levothyroxine (SYNTHROID) 50 MCG tablet Take 1 tablet (50 mcg total) by mouth once daily.       Review of Systems   Constitutional: Positive for malaise/fatigue.   HENT: Negative.    Eyes: Negative.    Respiratory: Negative.    Cardiovascular: Negative.    Gastrointestinal: Positive for abdominal pain, nausea and vomiting.   Genitourinary: Negative.    Musculoskeletal: Negative.    Skin: Negative.    Neurological: Negative.    Endo/Heme/Allergies: Negative.    Psychiatric/Behavioral: Negative.        Physical Examination:     Visit Vitals    BP (!) 110/56 (BP Location: Right arm, Patient Position: Lying, BP  "Method: Automatic)    Pulse (!) 50    Temp 98.4 °F (36.9 °C) (Oral)    Resp 18    Ht 5' 2" (1.575 m)    Wt 59 kg (130 lb)    LMP 11/15/2016    SpO2 98%    Breastfeeding No    BMI 23.78 kg/m2     Physical Exam   Constitutional: She is oriented to person, place, and time and well-developed, well-nourished, and in no distress.   HENT:   Head: Normocephalic and atraumatic.   Right Ear: External ear normal.   Left Ear: External ear normal.   Nose: Nose normal.   Mouth/Throat: No oropharyngeal exudate.   Eyes: Conjunctivae and EOM are normal. Pupils are equal, round, and reactive to light. No scleral icterus.   Neck: Normal range of motion. Neck supple. No JVD present. No thyromegaly present.   Cardiovascular: Normal rate, regular rhythm and normal heart sounds.    Pulmonary/Chest: Effort normal and breath sounds normal. She exhibits no tenderness.   Abdominal: Soft. Bowel sounds are normal. She exhibits no mass. There is tenderness (Moderate epigastric and RUQ renderness). There is no rebound and no guarding.   Musculoskeletal: Normal range of motion. She exhibits no edema or tenderness.   Neurological: She is alert and oriented to person, place, and time. She has normal reflexes. No cranial nerve deficit.   Skin: No rash noted.   Psychiatric: Mood, memory, affect and judgment normal.       Labs:     POCT urine pregnancy      1/5/2017    POC Preg Test, Ur Negative Negative     Acceptable Yes    Urinalysis    Specimen UA Urine, Clean Catch     Color, UA [place] Yellow, Straw, [place]    Appearance, UA Clear Clear    pH, UA 5.0 5.0 - 8.0    Specific Gravity, UA 1.030 1.005 - 1.030    Protein, UA 1+ (A) Negative    Glucose, UA Negative Negative    Ketones, UA Trace (A) Negative    Bilirubin (UA) 1+ (A) Negative    Occult Blood UA Negative Negative    Nitrite, UA Negative Negative    Urobilinogen, UA 4.0-6.0 (A) <2.0 EU/dL    Leukocytes, UA Negative Negative   Urinalysis Microscopic    RBC, UA 2 0 - 4 " /hpf    WBC, UA 3 0 - 5 /hpf    Bacteria, UA Few (A) None-Occ /hpf    Hyaline Casts, UA 1 0-1/lpf /lpf   Lipase    Lipase 15 4 - 60 U/L   CBC auto differential      1/6/2017    WBC 3.43 (L) 3.90 - 12.70 K/uL    RBC 3.48 (L) 4.00 - 5.40 M/uL    Hemoglobin 10.1 (L) 12.0 - 16.0 g/dL    Hematocrit 31.6 (L) 37.0 - 48.5 %    MCV 91 82 - 98 fL    MCH 29.0 27.0 - 31.0 pg    MCHC 32.0 32.0 - 36.0 %    RDW 14.4 11.5 - 14.5 %    Platelets 210 150 - 350 K/uL    MPV 9.2 9.2 - 12.9 fL    Gran # 0.8 (L) 1.8 - 7.7 K/uL    Lymph # 2.2 1.0 - 4.8 K/uL    Mono # 0.3 0.3 - 1.0 K/uL    Eos # 0.2 0.0 - 0.5 K/uL    Baso # 0.01 0.00 - 0.20 K/uL    Gran% 22.7 (L) 38.0 - 73.0 %    Lymph% 65.0 (H) 18.0 - 48.0 %    Mono% 7.6 4.0 - 15.0 %    Eosinophil% 4.4 0.0 - 8.0 %    Basophil% 0.3 0.0 - 1.9 %    Differential Method Automated    Comprehensive metabolic panel    Sodium 141 136 - 145 mmol/L    Potassium 4.2 3.5 - 5.1 mmol/L    Chloride 110 95 - 110 mmol/L    CO2 27 23 - 29 mmol/L    Glucose 107 70 - 110 mg/dL    BUN, Bld 6 6 - 20 mg/dL    Creatinine 0.7 0.5 - 1.4 mg/dL    Calcium 8.6 (L) 8.7 - 10.5 mg/dL    Total Protein 6.6 6.0 - 8.4 g/dL    Albumin 3.1 (L) 3.5 - 5.2 g/dL    Total Bilirubin 0.9 0.1 - 1.0 mg/dL    Alkaline Phosphatase 293 (H) 55 - 135 U/L     (H) 10 - 40 U/L     (H) 10 - 44 U/L    Anion Gap 4 (L) 8 - 16 mmol/L    eGFR if African American >60 >60 mL/min/1.73 m^2    eGFR if non African American >60 >60 mL/min/1.73 m^2     US Abdomen Limited     1/5/2017   Impression       Cholelithiasis without evidence of gallbladder wall thickening or pericholecystic fluid.  Negative sonographic Vidal's sign.  If there is persistent concern for acute cholecystitis, a HIDA scan may be obtained.     NM Hepatobiliary Imaging HIDA     1/6/2017  Impression      Normal hepatobiliary scan.  No scintigraphic evidence for acute cholecystitis.          Assessment:        Biliary colic    Cholelithiasis    Elevated LFT's    GERD  (gastroesophageal reflux disease)    Asthma    Normocytic anemia    Hypothyroidism    History of hepatitis A       Plan:   Admit to Med/surg  Analgesics prn  Famotidine IV BID  GI consult appreciated, recommendations noted  Surgery consult pending  Continue Synthroid  Thyroid panel ordered  DVT prophylaxis

## 2017-01-06 NOTE — PROGRESS NOTES
Patient post op surgery f/u appt scheduled with Dr. Philip for January 19, 2017 at 1:10 p.m.  Information placed on the AVS.  Alma Gilbert LMSW, GRABIEL-SISSY, CCM

## 2017-01-06 NOTE — ANESTHESIA PREPROCEDURE EVALUATION
2017  Ramona Auguste is a 36 y.o., female   Pre-operative evaluation for Procedure(s):  CHOLECYSTECTOMY-LAPAROSCOPIC    Ramona Auguste is a 36 y.o. female     Patient Active Problem List   Diagnosis    GERD (gastroesophageal reflux disease)    Asthma    Healthcare maintenance    History of hepatitis A    BMI 25.0-25.9,adult    Calculus of gallbladder without cholecystitis    Hypothyroidism    Cholelithiasis       Review of patient's allergies indicates:   Allergen Reactions    Epinephrine        No current facility-administered medications on file prior to encounter.      Current Outpatient Prescriptions on File Prior to Encounter   Medication Sig Dispense Refill    levothyroxine (SYNTHROID) 50 MCG tablet Take 1 tablet (50 mcg total) by mouth once daily. 30 tablet 6       Past Surgical History   Procedure Laterality Date    Tonsillectomy      Tubal ligation  2014     section       x 3       Social History     Social History    Marital status: Single     Spouse name: N/A    Number of children: 3    Years of education: N/A     Occupational History    Unemployed      Social History Main Topics    Smoking status: Never Smoker    Smokeless tobacco: Never Used    Alcohol use No    Drug use: No    Sexual activity: Yes     Partners: Male     Other Topics Concern    Not on file     Social History Narrative         Vital Signs Range (Last 24H):  Temp:  [36.4 °C (97.5 °F)-36.9 °C (98.4 °F)]   Pulse:  [48-60]   Resp:  [18]   BP: (108-133)/(53-67)   SpO2:  [98 %-100 %]       CBC:   Recent Labs      17   1756  17   0408   WBC  4.66  3.43*   RBC  4.09  3.48*   HGB  11.5*  10.1*   HCT  36.4*  31.6*   PLT  269  210   MCV  89  91   MCH  28.1  29.0   MCHC  31.6*  32.0       CMP:   Recent Labs      17   1756  17   0408   NA  139  141   K  4.0  4.2   CL  103  110   CO2   32*  27   BUN  7  6   CREATININE  0.8  0.7   GLU  100  107   CALCIUM  9.9  8.6*   ALBUMIN  3.9  3.1*   PROT  8.2  6.6   ALKPHOS  343*  293*   ALT  606*  638*   AST  464*  553*   BILITOT  0.9  0.9       [facility] Anesthesia Evaluation    I have reviewed the Patient Summary Reports.     I have reviewed the Medications.     Review of Systems  Anesthesia Hx:  No problems with previous Anesthesia Denies Hx of Anesthetic complications  History of prior surgery of interest to airway management or planning: Denies Family Hx of Anesthesia complications.   Denies Personal Hx of Anesthesia complications.   Social:  No Alcohol Use, Non-Smoker    Hematology/Oncology:  Hematology Normal   Oncology Normal     EENT/Dental:EENT/Dental Normal   Cardiovascular:  Cardiovascular Normal Exercise tolerance: good     Pulmonary:   Asthma mild and asymptomatic    Renal/:  Renal/ Normal     Hepatic/GI:   GERD Liver Disease, Elevated LFTs   Neurological:  Neurology Normal    Endocrine:   Hypothyroidism    Psych:  Psychiatric Normal           Physical Exam  General:  Well nourished    Airway/Jaw/Neck:  Airway Findings: Mouth Opening: Normal Tongue: Normal  General Airway Assessment: Adult, Average  Mallampati: II  TM Distance: Normal, at least 6 cm  Jaw/Neck Findings:  Neck ROM: Normal ROM      Dental:  Dental Findings: In tact   Chest/Lungs:  Chest/Lungs Findings: Normal Respiratory Rate     Heart/Vascular:  Heart Findings: Rate: Normal  Rhythm: Regular Rhythm        Mental Status:  Mental Status Findings:  Alert and Oriented, Cooperative         Anesthesia Plan  Type of Anesthesia, risks & benefits discussed:  Anesthesia Type:  general  Patient's Preference:   Intra-op Monitoring Plan: standard ASA monitors  Intra-op Monitoring Plan Comments:   Post Op Pain Control Plan:   Post Op Pain Control Plan Comments:   Induction:   IV  Beta Blocker:  Patient is not currently on a Beta-Blocker (No further documentation required).       Informed Consent:  Patient understands risks and agrees with Anesthesia plan.  Questions answered. Anesthesia consent signed with patient.  ASA Score: 2     Day of Surgery Review of History & Physical:    H&P update referred to the surgeon.         Ready For Surgery From Anesthesia Perspective.

## 2017-01-06 NOTE — ED PROVIDER NOTES
Encounter Date: 2017       History     Chief Complaint   Patient presents with    Flank Pain     R sided flank pain for seven days, denies urinary complaints, denies n/v/d/f, Hx hypothyroid, asthma, hepatitis     Review of patient's allergies indicates:   Allergen Reactions    Epinephrine      HPI Comments: 37yo f with pmh hypothyroidism, asthma, hx gallstones, presents to ED with chief complaint RUQ pain and R flank pain x 7 days. Pt states her pain began with R flank pain that began to involve her R side and RUQ. She states the pain is worst after she eats, although it has become constant. She denies fever/chills. Denies any other abdominal tenderness. She denies urinary symptoms, hematuria, burning during urination. Denies vaginal discharge or vaginal bleeding. Denies rash. Denies trauma.     The history is provided by the patient.     Past Medical History   Diagnosis Date    Asthma     Hepatitis      No past medical history pertinent negatives.  Past Surgical History   Procedure Laterality Date    Tonsillectomy      Tubal ligation  2014     section       x 3     Family History   Problem Relation Age of Onset    Hypertension Mother     Asthma Father     Hypertension Maternal Grandmother      Social History   Substance Use Topics    Smoking status: Never Smoker    Smokeless tobacco: Never Used    Alcohol use No     Review of Systems   Constitutional: Negative for activity change, appetite change, chills and fever.   HENT: Negative.    Eyes: Negative.    Respiratory: Negative for cough, chest tightness, shortness of breath and wheezing.    Cardiovascular: Negative for chest pain, palpitations and leg swelling.   Gastrointestinal: Positive for abdominal pain (RUQ pain). Negative for blood in stool, constipation, diarrhea, nausea and vomiting.   Endocrine: Negative.    Genitourinary: Positive for flank pain (R flank pain). Negative for difficulty urinating, dysuria, hematuria, menstrual  problem, pelvic pain, vaginal bleeding, vaginal discharge and vaginal pain.   Musculoskeletal: Negative for back pain, neck pain and neck stiffness.   Skin: Negative for rash and wound.   Allergic/Immunologic: Negative.    Neurological: Negative.    Hematological: Negative.    Psychiatric/Behavioral: Negative.    All other systems reviewed and are negative.      Physical Exam   Initial Vitals   BP Pulse Resp Temp SpO2   01/05/17 1731 01/05/17 1731 01/05/17 1731 01/05/17 1731 01/05/17 1731   110/60 60 18 97.8 °F (36.6 °C) 99 %     Physical Exam    Nursing note and vitals reviewed.  Constitutional: Vital signs are normal. She appears well-developed and well-nourished. She is cooperative. She does not have a sickly appearance. She does not appear ill. No distress.   Uncomfortable appearing   HENT:   Head: Normocephalic and atraumatic.   Nose: Nose normal.   Eyes: Conjunctivae and EOM are normal.   Neck: Normal range of motion. Neck supple.   Cardiovascular: Normal rate, regular rhythm, normal heart sounds and intact distal pulses.   No murmur heard.  Pulmonary/Chest: No respiratory distress. She has no wheezes. She exhibits no tenderness.   Abdominal: Soft. Normal appearance and bowel sounds are normal. She exhibits no distension and no mass. There is tenderness in the right upper quadrant. There is CVA tenderness (R CVA tenderness; +River's punch R flank) and positive Vidal's sign. There is no rebound and no guarding.   No peritoneal signs. No involuntary guarding.    Musculoskeletal: Normal range of motion. She exhibits no tenderness.   Lymphadenopathy:     She has no cervical adenopathy.   Neurological: She is alert and oriented to person, place, and time. She has normal strength.   Skin: Skin is warm and dry. No rash and no abscess noted. No erythema.   Psychiatric: She has a normal mood and affect. Her behavior is normal. Judgment and thought content normal.         ED Course   Procedures  Labs Reviewed   CBC W/  AUTO DIFFERENTIAL - Abnormal; Notable for the following:        Result Value    Hemoglobin 11.5 (*)     Hematocrit 36.4 (*)     MCHC 31.6 (*)     All other components within normal limits   URINALYSIS - Abnormal; Notable for the following:     Protein, UA 1+ (*)     Ketones, UA Trace (*)     Bilirubin (UA) 1+ (*)     Urobilinogen, UA 4.0-6.0 (*)     All other components within normal limits   URINALYSIS MICROSCOPIC - Abnormal; Notable for the following:     Bacteria, UA Few (*)     All other components within normal limits   COMPREHENSIVE METABOLIC PANEL   LIPASE   POCT URINE PREGNANCY             Medical Decision Making:   Initial Assessment:   37yo f presents to ED with chief complaint R flank and RUQ pain x 5 days.   Differential Diagnosis:   Gallstones, UTI, kidney stones, cholecystitis, cholangitis  Clinical Tests:   Lab Tests: Ordered and Reviewed  The following lab test(s) were unremarkable: CBC and Urinalysis       <> Summary of Lab: Alk phos, AST, ALT significantly elevated since November  Radiological Study: Ordered and Reviewed  ED Management:  Pt does appear ill/uncomfortable. Lacks peritoneal signs, but does have +Vidal's and significant RUQ tenderness. She does have a hx of gallstones. Abdominal u/s shows gallstones without cholecystitis. Pt afebrile, WBC normal, but still with significant pain. Because of her significant elevation in transaminases, constant pain, I do think she requires admission for hydration. Spoke to  and he agrees to accept her for admission. HIDA scan ordered for AM. GI and gen surg consulted.   Other:   I have discussed this case with another health care provider.                   ED Course     Clinical Impression:   The primary encounter diagnosis was Calculus of gallbladder without cholecystitis without obstruction. A diagnosis of Cholelithiasis was also pertinent to this visit.          Ryan Perdomo PA-C  01/05/17 8888

## 2017-01-07 VITALS
SYSTOLIC BLOOD PRESSURE: 118 MMHG | OXYGEN SATURATION: 99 % | HEIGHT: 62 IN | WEIGHT: 130 LBS | RESPIRATION RATE: 18 BRPM | BODY MASS INDEX: 23.92 KG/M2 | DIASTOLIC BLOOD PRESSURE: 63 MMHG | HEART RATE: 96 BPM | TEMPERATURE: 99 F

## 2017-01-07 PROBLEM — K80.00 ACUTE CHOLECYSTITIS DUE TO BILIARY CALCULUS: Status: ACTIVE | Noted: 2017-01-07

## 2017-01-07 PROCEDURE — 25000003 PHARM REV CODE 250: Performed by: SURGERY

## 2017-01-07 PROCEDURE — 25000003 PHARM REV CODE 250: Performed by: PHYSICIAN ASSISTANT

## 2017-01-07 PROCEDURE — G0378 HOSPITAL OBSERVATION PER HR: HCPCS

## 2017-01-07 RX ORDER — HYDROCODONE BITARTRATE AND ACETAMINOPHEN 5; 325 MG/1; MG/1
1 TABLET ORAL EVERY 6 HOURS PRN
Qty: 30 TABLET | Refills: 0 | Status: SHIPPED | OUTPATIENT
Start: 2017-01-07 | End: 2017-01-26

## 2017-01-07 RX ADMIN — HYDROCODONE BITARTRATE AND ACETAMINOPHEN 1 TABLET: 5; 325 TABLET ORAL at 12:01

## 2017-01-07 RX ADMIN — HYDROCODONE BITARTRATE AND ACETAMINOPHEN 1 TABLET: 5; 325 TABLET ORAL at 02:01

## 2017-01-07 RX ADMIN — FAMOTIDINE 20 MG: 10 INJECTION, SOLUTION INTRAVENOUS at 08:01

## 2017-01-07 RX ADMIN — HYDROCODONE BITARTRATE AND ACETAMINOPHEN 1 TABLET: 5; 325 TABLET ORAL at 08:01

## 2017-01-07 RX ADMIN — DEXTROSE MONOHYDRATE AND SODIUM CHLORIDE: 5; .9 INJECTION, SOLUTION INTRAVENOUS at 08:01

## 2017-01-07 NOTE — PLAN OF CARE
Problem: Cholecystectomy (Adult)  Goal: Signs and Symptoms of Listed Potential Problems Will be Absent, Minimized or Managed (Cholecystectomy)  Signs and symptoms of listed potential problems will be absent, minimized or managed by discharge/transition of care (reference Cholecystectomy (Adult) CPG).  Outcome: Ongoing (interventions implemented as appropriate)    01/07/17 0445   Cholecystectomy   Problems Assessed (Cholecystectomy) all   Problems Present (Cholecystectomy) pain

## 2017-01-07 NOTE — PROGRESS NOTES
Chief Complaint / Reason for Consult: Abd. Pain    Pt. reporting some post-op pain.  No N/V    ROS: No CP, SOB, F/C    Patient Vitals for the past 24 hrs:   BP Temp Temp src Pulse Resp SpO2   01/07/17 0825 (!) 105/56 97.7 °F (36.5 °C) Oral - 18 96 %   01/07/17 0510 139/82 97.9 °F (36.6 °C) Oral 84 18 97 %   01/07/17 0125 113/61 98.7 °F (37.1 °C) Oral 71 18 100 %   01/06/17 2009 114/64 97.7 °F (36.5 °C) Oral 70 20 100 %   01/06/17 1700 129/66 97.5 °F (36.4 °C) Oral 69 18 100 %   01/06/17 1600 - - - 78 - 100 %   01/06/17 1550 109/72 97.4 °F (36.3 °C) - 80 12 99 %   01/06/17 1535 118/72 - - 90 14 99 %   01/06/17 1520 (!) 104/58 - - 100 - 95 %   01/06/17 1515 (!) 111/59 - - 100 12 (!) 94 %   01/06/17 1514 (!) 114/56 97.7 °F (36.5 °C) - 98 - (!) 94 %       Physical Exam:  Gen - Well developed, well nourished, no apparent distress  HEENT - Anicteric  CV - S1, S2, no murmurs/rubs  Lungs - CTA-B, normal excursion  Abd - Soft, mild/appropriate TTP in RUQ, no rebound/guarding, ND, normal BS's, no HSM.  Ext - No c/c/e  Neuro - No asterixis    Labs:  No new labs    Imaging:  Reviewed U/S, HIDA    Assessment:  This patient is a 36 y.o. female with:   1. Cholelithiasis - Now s/p cholecystectomy.  Previous Bili was normal, with normal CBD.    2. Abd. Pain - secondary to #1.    3. Hx of Asthma....    Recommendations:  1. Post-op care.  2. Follow LFT's.  3. Diet per surgery.  4. Will s/o for now.  Please reconsult, PRN.  Thanks.

## 2017-01-07 NOTE — PLAN OF CARE
01/07/17 1616   Discharge Assessment   Assessment Type Discharge Planning Assessment   Patient/Family In Agreement With Plan unable to assess  (Attempted d/c assessment but patient was discharged.)

## 2017-01-07 NOTE — PLAN OF CARE
01/07/17 1407   Final Note   Assessment Type Final Discharge Note   Discharge Disposition Home   Discharge planning education complete? Yes   What phone number can be called within the next 1-3 days to see how you are doing after discharge? [phone]   Hospital Follow Up  Appt(s) scheduled? Yes   Discharge plans and expectations educations in teach back method with documentation complete? No   Offered [facility] -- Bedside Delivery? n/a   Discharge/Hospital Encounter Summary to ([facility]) PCP n/a   Referral to Outpatient Case Management complete? n/a   Referral to / orders for Home Health Complete? n/a   30 day supply of medicines given at discharge, if documented non-compliance / non-adherence? n/a   Any social issues identified prior to discharge? n/a   Did you assess the readiness or willingness of the family or caregiver to support self management of care? n/a   Right Care Referral Info   Post Acute Recommendation No Care

## 2017-01-07 NOTE — PLAN OF CARE
Problem: Patient Care Overview  Goal: Plan of Care Review  Outcome: Ongoing (interventions implemented as appropriate)  Pt with several lap nimisha sites. Gauze with Tegaderm noted. Site to midline upper abdomen with scant amount of dried bloody drainage. No new drainage noted. Pt medicated with PRN hydrocodone. No falls or injury. Pt stable and ready for discharge.

## 2017-01-07 NOTE — PROGRESS NOTES
Progress Note    Admit Date: 1/5/2017   LOS: 0 days     SUBJECTIVE:           OBJECTIVE:       Vital Signs Range (Last 24H):  Temp:  [97.4 °F (36.3 °C)-98.7 °F (37.1 °C)]   Pulse:  []   Resp:  [12-20]   BP: (104-139)/(56-82)   SpO2:  [94 %-100 %]     I & O (Last 24H):  Intake/Output Summary (Last 24 hours) at 01/07/17 1249  Last data filed at 01/07/17 1149   Gross per 24 hour   Intake          3290.01 ml   Output                0 ml   Net          3290.01 ml         Physical Exam:  NAD  Abdomen: soft, non-tender, non-distended.  Dressing ok      ASSESSMENT/PLAN:     Biliary colic  - s/p lap nimisha  - post op looks good, feeling much better    Ok for discharge from my standpoint.  Activity as tolerated except no lifting greater than 20lbs.  Diet of choice.  Outer dressings off in 24 hours.  Ok to shower in 24 hours.  No baths or submerging wound under water.  Can f/u me in 2 weeks or sooner if needed(previously arranged in discharge).

## 2017-01-07 NOTE — NURSING
22g PIV removed. Site without redness pain swelling or drainage. Gauze and tape applied to site. Pt tolerated well.

## 2017-01-07 NOTE — DISCHARGE SUMMARY
[facility]  Discharge Summary      Admit Date: 1/5/2017    Discharge Date and Time: 01/07/2017  @ 2pm    Attending Physician: Sharon Saab MD     Reason for Admission: Calculus of gallbladder without cholecystitis without obstruction [K80.20]  Cholelithiasis [K80.20]  Cholelithiases [K80.20]  Cholecystitis [K81.9]  Calculus of gallbladder without cholecystitis without obstruction [K80.20]      Procedures Performed: Procedure(s) (LRB):  CHOLECYSTECTOMY-LAPAROSCOPIC (N/A)    Hospital Course (synopsis of major diagnoses, care, treatment, and services provided during the course of the hospital stay):     36 y.o. female with history of hypothyroidism and asthma admitted to [facility] on 01/06/2017 with complaints of recurrent epigastric and RUQ pain, associated with nausea and vomiting ,starting 2 weeks ago with an acute exacerbation few hours prior to her arrival at [facility].  She denies fever, hematemesis or melena.     During this hospitalization, these following conditions were addressed and managed along with other comorbid conditions:       Active Hospital Problems    Diagnosis  POA    *Acute cholecystitis due to biliary calculus [K80.00]  Unknown    Biliary colic [K80.50]  Yes    Cholelithiasis [K80.20]  Yes    Calculus of gallbladder without cholecystitis [K80.20]  Yes      Resolved Hospital Problems    Diagnosis Date Resolved POA   No resolved problems to display.     - US and HIDA done  - underwent successful lap nimisha   - ok to dc from surgical stand point per Dr. Philip  - pt tolerating diet  - see specific diet and lifting restriction     2. Hypothyroidism: pt to continue home dose so synthroid 50mcg    3. Asthma - mild without exacerbation   - stable    Consults: GI and general surgery    Significant Diagnostic Studies: see above     Final Diagnoses:    Principal Problem: Acute cholecystitis due to biliary calculus   Secondary Diagnoses:   Active  Hospital Problems    Diagnosis  POA    *Acute cholecystitis due to biliary calculus [K80.00]  Unknown    Biliary colic [K80.50]  Yes    Cholelithiasis [K80.20]  Yes    Calculus of gallbladder without cholecystitis [K80.20]  Yes      Resolved Hospital Problems    Diagnosis Date Resolved POA   No resolved problems to display.       Discharged Condition: stable    Disposition: Home or Self Care     Activity: Activity as tolerated except no lifting greater than 20lbs.     Diet of choice. Outer dressings off in 24 hours.    Ok to shower in 24 hours. No baths or submerging wound under water.         Follow Up/Patient Instructions:     Medications:  Reconciled Home Medications:   Current Discharge Medication List      START taking these medications    Details   hydrocodone-acetaminophen 5-325mg (NORCO) 5-325 mg per tablet Take 1 tablet by mouth every 6 (six) hours as needed.  Qty: 30 tablet, Refills: 0    Associated Diagnoses: Acute cholecystitis due to biliary calculus         CONTINUE these medications which have NOT CHANGED    Details   levothyroxine (SYNTHROID) 50 MCG tablet Take 1 tablet (50 mcg total) by mouth once daily.  Qty: 30 tablet, Refills: 6    Associated Diagnoses: Hypothyroidism, unspecified type         STOP taking these medications       omeprazole (PRILOSEC) 40 MG capsule Comments:   Reason for Stopping:             No discharge procedures on file.  Follow-up Information     Follow up with Sharon Saab MD. Schedule an appointment as soon as possible for a visit in 1 week.    Specialty:  Family Medicine    Contact information:    [address] [place] [id number] [phone]          Follow up with Louie Philip MD On 1/19/2017.    Specialty:  General Surgery    Why:  1:10 p.m. on January 19, 2017 see Dr. Philip for your hospital followup visit.    Contact information:    [address] [place] [address] [phone]          christine Note    Admit Date: 1/5/2017   LOS: 0 days     SUBJECTIVE:      Follow-up For:  Acute cholecystitis     Interval History    01/07/2017: underwent successful lap cholecystectomy. Tolerating po. Denies fever or chills.       Scheduled Meds:   famotidine (PF)  20 mg Intravenous Q12H     Continuous Infusions:   dextrose 5 % and 0.9 % NaCl 125 mL/hr at 01/07/17 0833     PRN Meds:hydrocodone-acetaminophen 5-325mg, morphine, ondansetron, promethazine (PHENERGAN) IVPB    Review of patient's allergies indicates:   Allergen Reactions    Epinephrine        Review of Systems  Denies HA, blurred vision, chest pain.   + abdominal pain. No n/v. No BM    OBJECTIVE:     Vital Signs (Most Recent)  Temp: 97.7 °F (36.5 °C) (01/07/17 0825)  Pulse: 84 (01/07/17 0510)  Resp: 18 (01/07/17 0825)  BP: (!) 105/56 (01/07/17 0825)  SpO2: 96 % (01/07/17 0825)    Vital Signs Range (Last 24H):  Temp:  [97.4 °F (36.3 °C)-98.7 °F (37.1 °C)]   Pulse:  []   Resp:  [12-20]   BP: (104-139)/(56-82)   SpO2:  [94 %-100 %]     I & O (Last 24H):  Intake/Output Summary (Last 24 hours) at 01/07/17 0930  Last data filed at 01/07/17 0841   Gross per 24 hour   Intake          2898.34 ml   Output                0 ml   Net          2898.34 ml     Physical Exam:    General: NAD, resting in bed.  at the bedside  Head: normocephalic, atraumatic   Eyes: conjunctivae pink, anicteric sclera. PERRL.   Throat: No erythema or post nasal discharge   Neck: supple, no LAD   Lungs: CTAB   Heart: S1, S2, RRR   Abdomen: + BS, tenderness to palpation. Trochar site clean and dry   Extremities: no cyanosis or edema.   Skin: turgor normal, no erythema or rashes. No abnormal moles  MS: LE strength 5/5   Psy: pleasant, affect is congruent to mood       Laboratory:  CBC:   Recent Labs  Lab 01/06/17  0408   WBC 3.43*   RBC 3.48*   HGB 10.1*   HCT 31.6*      MCV 91   MCH 29.0   MCHC 32.0     CMP:   Recent Labs  Lab 01/06/17  0408      CALCIUM 8.6*   ALBUMIN 3.1*   PROT 6.6      K 4.2   CO2 27      BUN 6    CREATININE 0.7   ALKPHOS 293*   *   *   BILITOT 0.9     Coagulation: No results for input(s): INR, APTT in the last 168 hours.    Invalid input(s): PT  Cardiac markers: No results for input(s): CKMB, CPKMB, TROPONINT, TROPONINI, MYOGLOBIN in the last 168 hours.  Microbiology Results (last 7 days)     ** No results found for the last 168 hours. **        Specimen     None          Recent Labs  Lab 01/05/17  1750   COLORU [place]   SPECGRAV 1.030   PHUR 5.0   PROTEINUA 1+*   BACTERIA Few*   NITRITE Negative   LEUKOCYTESUR Negative   UROBILINOGEN 4.0-6.0*   HYALINECASTS 1       Diagnostic Results:  Cholelithiasis without evidence of gallbladder wall thickening or pericholecystic fluid.  Negative sonographic Vidal's sign.  If there is persistent concern for acute cholecystitis, a HIDA scan may be obtained.    Normal hepatobiliary scan.  No scintigraphic evidence for acute cholecystitis.    Current Facility-Administered Medications   Medication Dose Route Frequency Provider Last Rate Last Dose    dextrose 5 % and 0.9 % NaCl infusion   Intravenous Continuous Ryan Perdomo PA-C   Stopped at 01/07/17 1506    famotidine (PF) 20 mg/2 mL injection 20 mg  20 mg Intravenous Q12H Ryan Perdomo PA-C   20 mg at 01/07/17 0832    hydrocodone-acetaminophen 5-325mg per tablet 1 tablet  1 tablet Oral Q6H PRN Louie Philip MD   1 tablet at 01/07/17 1457    morphine injection 4 mg  4 mg Intravenous Q4H PRN Louie Philip MD   4 mg at 01/06/17 1804    ondansetron injection 4 mg  4 mg Intravenous Q12H PRN Ryan Perodmo PA-C        promethazine (PHENERGAN) 6.25 mg in dextrose 5 % 50 mL IVPB  6.25 mg Intravenous Q6H PRN Ryan Perdomo PA-C             ASSESSMENT/PLAN:     Active Hospital Problems    Diagnosis  POA    *Acute cholecystitis due to biliary calculus [K80.00]  Unknown    Biliary colic [K80.50]  Yes    Cholelithiasis [K80.20]  Yes    Calculus of gallbladder without cholecystitis  [K80.20]  Yes      Resolved Hospital Problems    Diagnosis Date Resolved POA   No resolved problems to display.     - underwent successful lap nimisha   - ok to dc from surgical stand point per Dr. Philip  - pt tolerating diet  - see specific diet and lifting restriction     2. Hypothyroidism: pt to continue home dose so synthroid 50mcg    3. Asthma - mild without exacerbation   - stable    Covering for Dr. Scarlett Ornelas M.D  Internal Medicine & Geriatric Medicine  Hematology & Oncology  Palliative Medicine    [address] [facility], [address]  [place] [address] [phone] (Office)  [phone] (Fax)

## 2017-01-07 NOTE — NURSING
Discharge instructions provided to pt regarding medication changes, where to get mediations, diet order, no heavy lifting >20lbs, no submerging in water, okay to shower in 24hrs.  Pt educated that she may remove outer dressing in 24hrs as well. Pt educated on pain medication frequency and constipation side effect. Copy of instructions given to pt in [place] per pt request. Pt in no acute distress. Pt verbalized understanding of all instructions.

## 2017-01-09 PROBLEM — K80.00 ACUTE CHOLECYSTITIS DUE TO BILIARY CALCULUS: Status: ACTIVE | Noted: 2017-01-09

## 2017-01-09 LAB
HAV IGM SERPL QL IA: NEGATIVE
HBV CORE IGM SERPL QL IA: NEGATIVE
HBV SURFACE AG SERPL QL IA: NEGATIVE
HCV AB SERPL QL IA: NEGATIVE

## 2017-01-17 ENCOUNTER — HOSPITAL ENCOUNTER (OUTPATIENT)
Dept: RADIOLOGY | Facility: HOSPITAL | Age: 37
Discharge: HOME | End: 2017-01-17
Payer: MEDICAID

## 2017-01-17 PROCEDURE — 76536 US EXAM OF HEAD AND NECK: CPT | Mod: TC

## 2017-01-17 PROCEDURE — 76536 US EXAM OF HEAD AND NECK: CPT | Mod: 26,,, | Performed by: RADIOLOGY

## 2017-01-26 PROBLEM — K80.00 ACUTE CHOLECYSTITIS DUE TO BILIARY CALCULUS: Status: RESOLVED | Noted: 2017-01-09 | Resolved: 2017-01-26

## 2017-01-26 PROBLEM — E04.1 THYROID NODULE: Status: ACTIVE | Noted: 2017-01-26

## 2017-01-26 PROBLEM — K80.20 CHOLELITHIASIS: Status: RESOLVED | Noted: 2017-01-05 | Resolved: 2017-01-26

## 2017-01-26 PROBLEM — R79.89 ELEVATED LFTS: Status: ACTIVE | Noted: 2017-01-26

## 2017-01-26 PROBLEM — E78.00 HYPERCHOLESTEROLEMIA: Status: ACTIVE | Noted: 2017-01-26

## 2017-01-26 PROBLEM — K80.50 BILIARY COLIC: Status: RESOLVED | Noted: 2017-01-06 | Resolved: 2017-01-26

## 2017-10-20 DIAGNOSIS — E03.9 HYPOTHYROIDISM: Primary | ICD-10-CM

## 2017-10-26 ENCOUNTER — HOSPITAL ENCOUNTER (OUTPATIENT)
Dept: RADIOLOGY | Facility: HOSPITAL | Age: 37
Discharge: HOME | End: 2017-10-26
Attending: NURSE PRACTITIONER
Payer: MEDICAID

## 2017-10-26 DIAGNOSIS — E03.9 HYPOTHYROIDISM: ICD-10-CM

## 2017-10-26 PROCEDURE — 76536 US EXAM OF HEAD AND NECK: CPT | Mod: 26,,, | Performed by: RADIOLOGY

## 2017-10-26 PROCEDURE — 76536 US EXAM OF HEAD AND NECK: CPT | Mod: TC

## (undated) DEVICE — GAUZE SPONGE 4X4 12PLY

## (undated) DEVICE — COVER OVERHEAD SURG LT BLUE

## (undated) DEVICE — CLOSURE SKIN STERI STRIP 1/2X4

## (undated) DEVICE — CANISTER SUCTION 2 LTR

## (undated) DEVICE — DISSECTOR CURVED 5DCD

## (undated) DEVICE — TUBING INSUFFLATION 10

## (undated) DEVICE — TROCAR ENDOPATH XCEL 5X100MM

## (undated) DEVICE — NDL HYPO REG 25G X 1 1/2

## (undated) DEVICE — DRESSING TRANS 2X2 TEGADERM

## (undated) DEVICE — SCISSOR CURVED ENDOPATH 5MM

## (undated) DEVICE — Device

## (undated) DEVICE — SUT VICRYL+ 27 UR-6 VIOL

## (undated) DEVICE — ELECTRODE BLADE INSULATED 1 IN

## (undated) DEVICE — SEE MEDLINE ITEM 152622

## (undated) DEVICE — TROCAR KII BLLN 12MM 10CM

## (undated) DEVICE — CHLORAPREP W TINT 26ML APPL

## (undated) DEVICE — GLOVE SURG BIOGEL LATEX SZ 7.5

## (undated) DEVICE — APPLIER CLIP ENDO LIGAMAX 5MM

## (undated) DEVICE — IRRIGATOR ENDOSCOPY DISP.

## (undated) DEVICE — BLANKET UPPER BODY 78.7X29.9IN

## (undated) DEVICE — SLEEVE SCD EXPRESS CALF MEDIUM

## (undated) DEVICE — SYR 10CC LUER LOCK

## (undated) DEVICE — SOL NS 1000CC

## (undated) DEVICE — ELECTRODE REM PLYHSV RETURN 9

## (undated) DEVICE — SUT MONOCRYL 4-0 PS-2

## (undated) DEVICE — PACK ENDOSCOPY GENERAL

## (undated) DEVICE — KIT ANTIFOG

## (undated) DEVICE — BAG TISS RETRV MONARCH 10MM